# Patient Record
Sex: MALE | Race: WHITE | NOT HISPANIC OR LATINO | URBAN - METROPOLITAN AREA
[De-identification: names, ages, dates, MRNs, and addresses within clinical notes are randomized per-mention and may not be internally consistent; named-entity substitution may affect disease eponyms.]

---

## 2018-06-14 ENCOUNTER — INPATIENT (INPATIENT)
Facility: HOSPITAL | Age: 68
LOS: 0 days | Discharge: HOME | DRG: 310 | End: 2018-06-15
Attending: INTERNAL MEDICINE | Admitting: FAMILY MEDICINE
Payer: COMMERCIAL

## 2018-06-14 VITALS — WEIGHT: 210.1 LBS | HEIGHT: 75 IN

## 2018-06-14 PROCEDURE — 99285 EMERGENCY DEPT VISIT HI MDM: CPT

## 2018-06-14 RX ORDER — SODIUM CHLORIDE 9 MG/ML
3 INJECTION INTRAMUSCULAR; INTRAVENOUS; SUBCUTANEOUS ONCE
Qty: 0 | Refills: 0 | Status: COMPLETED | OUTPATIENT
Start: 2018-06-14 | End: 2018-06-14

## 2018-06-14 RX ORDER — ENOXAPARIN SODIUM 100 MG/ML
100 INJECTION SUBCUTANEOUS ONCE
Qty: 0 | Refills: 0 | Status: COMPLETED | OUTPATIENT
Start: 2018-06-14 | End: 2018-06-14

## 2018-06-14 RX ADMIN — SODIUM CHLORIDE 3 MILLILITER(S): 9 INJECTION INTRAMUSCULAR; INTRAVENOUS; SUBCUTANEOUS at 23:04

## 2018-06-14 NOTE — ED PROVIDER NOTE - OBJECTIVE STATEMENT
69 y/o M with PSHx of open heart surgery presents to ED c/o epigastric pain onset today. Patient states he has cardiac hx, and before he had bypass, experienced similar symptoms to today. States he feels like he has indigestion and was diaphoretic PTA, with slight neck pain. Denies fevers, chills, chest pain, difficulty breathing, nausea, vomiting, headaches, edema, or back pain. After open heart surgery, was found to be in a-fib but spontaneously converted without need for medications. No further acute complaints at this time.

## 2018-06-14 NOTE — ED PROVIDER NOTE - CARE PLAN
Principal Discharge DX:	New onset atrial fibrillation  Secondary Diagnosis:	CAD (coronary artery disease)

## 2018-06-14 NOTE — ED PROVIDER NOTE - SKIN, MLM
Skin normal color for race, warm, dry and intact. No evidence of rash. Skin normal color for race, warm, dry and intact. No evidence of rash. Well healed midline sternotomy scar.

## 2018-06-15 VITALS
SYSTOLIC BLOOD PRESSURE: 116 MMHG | DIASTOLIC BLOOD PRESSURE: 68 MMHG | OXYGEN SATURATION: 94 % | RESPIRATION RATE: 17 BRPM | HEART RATE: 144 BPM

## 2018-06-15 DIAGNOSIS — Z98.890 OTHER SPECIFIED POSTPROCEDURAL STATES: Chronic | ICD-10-CM

## 2018-06-15 DIAGNOSIS — Z29.9 ENCOUNTER FOR PROPHYLACTIC MEASURES, UNSPECIFIED: ICD-10-CM

## 2018-06-15 DIAGNOSIS — E78.5 HYPERLIPIDEMIA, UNSPECIFIED: ICD-10-CM

## 2018-06-15 DIAGNOSIS — I48.91 UNSPECIFIED ATRIAL FIBRILLATION: ICD-10-CM

## 2018-06-15 DIAGNOSIS — I10 ESSENTIAL (PRIMARY) HYPERTENSION: ICD-10-CM

## 2018-06-15 DIAGNOSIS — I25.10 ATHEROSCLEROTIC HEART DISEASE OF NATIVE CORONARY ARTERY WITHOUT ANGINA PECTORIS: ICD-10-CM

## 2018-06-15 LAB
ALBUMIN SERPL ELPH-MCNC: 4.4 G/DL — SIGNIFICANT CHANGE UP (ref 3.3–5.2)
ALP SERPL-CCNC: 71 U/L — SIGNIFICANT CHANGE UP (ref 40–120)
ALT FLD-CCNC: 21 U/L — SIGNIFICANT CHANGE UP
ANION GAP SERPL CALC-SCNC: 14 MMOL/L — SIGNIFICANT CHANGE UP (ref 5–17)
APTT BLD: 29.8 SEC — SIGNIFICANT CHANGE UP (ref 27.5–37.4)
AST SERPL-CCNC: 19 U/L — SIGNIFICANT CHANGE UP
BILIRUB SERPL-MCNC: 1.7 MG/DL — SIGNIFICANT CHANGE UP (ref 0.4–2)
BUN SERPL-MCNC: 25 MG/DL — HIGH (ref 8–20)
CALCIUM SERPL-MCNC: 9 MG/DL — SIGNIFICANT CHANGE UP (ref 8.6–10.2)
CHLORIDE SERPL-SCNC: 99 MMOL/L — SIGNIFICANT CHANGE UP (ref 98–107)
CK SERPL-CCNC: 87 U/L — SIGNIFICANT CHANGE UP (ref 30–200)
CO2 SERPL-SCNC: 24 MMOL/L — SIGNIFICANT CHANGE UP (ref 22–29)
CREAT SERPL-MCNC: 0.73 MG/DL — SIGNIFICANT CHANGE UP (ref 0.5–1.3)
D DIMER BLD IA.RAPID-MCNC: 660 NG/ML DDU — HIGH
D DIMER BLD IA.RAPID-MCNC: 731 NG/ML DDU — HIGH
GLUCOSE SERPL-MCNC: 113 MG/DL — SIGNIFICANT CHANGE UP (ref 70–115)
HCT VFR BLD CALC: 46 % — SIGNIFICANT CHANGE UP (ref 42–52)
HGB BLD-MCNC: 15.5 G/DL — SIGNIFICANT CHANGE UP (ref 14–18)
INR BLD: 1.03 RATIO — SIGNIFICANT CHANGE UP (ref 0.88–1.16)
LYMPHOCYTES # BLD AUTO: 6 % — LOW (ref 20–55)
MCHC RBC-ENTMCNC: 29 PG — SIGNIFICANT CHANGE UP (ref 27–31)
MCHC RBC-ENTMCNC: 33.7 G/DL — SIGNIFICANT CHANGE UP (ref 32–36)
MCV RBC AUTO: 86 FL — SIGNIFICANT CHANGE UP (ref 80–94)
MONOCYTES NFR BLD AUTO: 7 % — SIGNIFICANT CHANGE UP (ref 3–10)
NEUTROPHILS NFR BLD AUTO: 87 % — HIGH (ref 37–73)
NT-PROBNP SERPL-SCNC: 699 PG/ML — HIGH (ref 0–300)
PLAT MORPH BLD: NORMAL — SIGNIFICANT CHANGE UP
PLATELET # BLD AUTO: 179 K/UL — SIGNIFICANT CHANGE UP (ref 150–400)
POTASSIUM SERPL-MCNC: 3.8 MMOL/L — SIGNIFICANT CHANGE UP (ref 3.5–5.3)
POTASSIUM SERPL-SCNC: 3.8 MMOL/L — SIGNIFICANT CHANGE UP (ref 3.5–5.3)
PROT SERPL-MCNC: 7.3 G/DL — SIGNIFICANT CHANGE UP (ref 6.6–8.7)
PROTHROM AB SERPL-ACNC: 11.3 SEC — SIGNIFICANT CHANGE UP (ref 9.8–12.7)
RBC # BLD: 5.35 M/UL — SIGNIFICANT CHANGE UP (ref 4.6–6.2)
RBC # FLD: 12.6 % — SIGNIFICANT CHANGE UP (ref 11–15.6)
RBC BLD AUTO: NORMAL — SIGNIFICANT CHANGE UP
SODIUM SERPL-SCNC: 137 MMOL/L — SIGNIFICANT CHANGE UP (ref 135–145)
TROPONIN T SERPL-MCNC: <0.01 NG/ML — SIGNIFICANT CHANGE UP (ref 0–0.06)
TROPONIN T SERPL-MCNC: <0.01 NG/ML — SIGNIFICANT CHANGE UP (ref 0–0.06)
TSH SERPL-MCNC: 2.09 UIU/ML — SIGNIFICANT CHANGE UP (ref 0.27–4.2)
WBC # BLD: 10.6 K/UL — SIGNIFICANT CHANGE UP (ref 4.8–10.8)
WBC # FLD AUTO: 10.6 K/UL — SIGNIFICANT CHANGE UP (ref 4.8–10.8)

## 2018-06-15 PROCEDURE — 71275 CT ANGIOGRAPHY CHEST: CPT | Mod: 26

## 2018-06-15 PROCEDURE — 93320 DOPPLER ECHO COMPLETE: CPT

## 2018-06-15 PROCEDURE — 84484 ASSAY OF TROPONIN QUANT: CPT

## 2018-06-15 PROCEDURE — 71045 X-RAY EXAM CHEST 1 VIEW: CPT | Mod: 26

## 2018-06-15 PROCEDURE — 93325 DOPPLER ECHO COLOR FLOW MAPG: CPT

## 2018-06-15 PROCEDURE — 71045 X-RAY EXAM CHEST 1 VIEW: CPT

## 2018-06-15 PROCEDURE — 93320 DOPPLER ECHO COMPLETE: CPT | Mod: 26

## 2018-06-15 PROCEDURE — 80053 COMPREHEN METABOLIC PANEL: CPT

## 2018-06-15 PROCEDURE — 99223 1ST HOSP IP/OBS HIGH 75: CPT | Mod: GC

## 2018-06-15 PROCEDURE — 85610 PROTHROMBIN TIME: CPT

## 2018-06-15 PROCEDURE — 85379 FIBRIN DEGRADATION QUANT: CPT

## 2018-06-15 PROCEDURE — 99223 1ST HOSP IP/OBS HIGH 75: CPT | Mod: 25

## 2018-06-15 PROCEDURE — 93312 ECHO TRANSESOPHAGEAL: CPT | Mod: 26

## 2018-06-15 PROCEDURE — 96374 THER/PROPH/DIAG INJ IV PUSH: CPT

## 2018-06-15 PROCEDURE — 93312 ECHO TRANSESOPHAGEAL: CPT

## 2018-06-15 PROCEDURE — 93325 DOPPLER ECHO COLOR FLOW MAPG: CPT | Mod: 26

## 2018-06-15 PROCEDURE — 92960 CARDIOVERSION ELECTRIC EXT: CPT

## 2018-06-15 PROCEDURE — 12345: CPT | Mod: NC

## 2018-06-15 PROCEDURE — 93005 ELECTROCARDIOGRAM TRACING: CPT

## 2018-06-15 PROCEDURE — 93010 ELECTROCARDIOGRAM REPORT: CPT

## 2018-06-15 PROCEDURE — 85730 THROMBOPLASTIN TIME PARTIAL: CPT

## 2018-06-15 PROCEDURE — 71275 CT ANGIOGRAPHY CHEST: CPT

## 2018-06-15 PROCEDURE — 96372 THER/PROPH/DIAG INJ SC/IM: CPT | Mod: XU

## 2018-06-15 PROCEDURE — 99285 EMERGENCY DEPT VISIT HI MDM: CPT | Mod: 25

## 2018-06-15 PROCEDURE — 82550 ASSAY OF CK (CPK): CPT

## 2018-06-15 PROCEDURE — 84443 ASSAY THYROID STIM HORMONE: CPT

## 2018-06-15 PROCEDURE — 36415 COLL VENOUS BLD VENIPUNCTURE: CPT

## 2018-06-15 PROCEDURE — 85027 COMPLETE CBC AUTOMATED: CPT

## 2018-06-15 PROCEDURE — 83880 ASSAY OF NATRIURETIC PEPTIDE: CPT

## 2018-06-15 RX ORDER — METOPROLOL TARTRATE 50 MG
50 TABLET ORAL
Qty: 0 | Refills: 0 | Status: DISCONTINUED | OUTPATIENT
Start: 2018-06-15 | End: 2018-06-15

## 2018-06-15 RX ORDER — ATORVASTATIN CALCIUM 80 MG/1
80 TABLET, FILM COATED ORAL AT BEDTIME
Qty: 0 | Refills: 0 | Status: DISCONTINUED | OUTPATIENT
Start: 2018-06-15 | End: 2018-06-15

## 2018-06-15 RX ORDER — ASPIRIN/CALCIUM CARB/MAGNESIUM 324 MG
1 TABLET ORAL
Qty: 90 | Refills: 0 | OUTPATIENT
Start: 2018-06-15 | End: 2018-09-12

## 2018-06-15 RX ORDER — ENOXAPARIN SODIUM 100 MG/ML
95 INJECTION SUBCUTANEOUS ONCE
Qty: 0 | Refills: 0 | Status: DISCONTINUED | OUTPATIENT
Start: 2018-06-15 | End: 2018-06-15

## 2018-06-15 RX ORDER — AMLODIPINE BESYLATE 2.5 MG/1
5 TABLET ORAL DAILY
Qty: 0 | Refills: 0 | Status: DISCONTINUED | OUTPATIENT
Start: 2018-06-15 | End: 2018-06-15

## 2018-06-15 RX ORDER — ENOXAPARIN SODIUM 100 MG/ML
100 INJECTION SUBCUTANEOUS
Qty: 0 | Refills: 0 | Status: DISCONTINUED | OUTPATIENT
Start: 2018-06-15 | End: 2018-06-15

## 2018-06-15 RX ORDER — SODIUM CHLORIDE 9 MG/ML
1000 INJECTION, SOLUTION INTRAVENOUS
Qty: 0 | Refills: 0 | Status: DISCONTINUED | OUTPATIENT
Start: 2018-06-15 | End: 2018-06-15

## 2018-06-15 RX ORDER — DICLOFENAC SODIUM 75 MG/1
75 TABLET, DELAYED RELEASE ORAL
Qty: 0 | Refills: 0 | COMMUNITY

## 2018-06-15 RX ORDER — ATORVASTATIN CALCIUM 80 MG/1
1 TABLET, FILM COATED ORAL
Qty: 0 | Refills: 0 | COMMUNITY

## 2018-06-15 RX ORDER — ENOXAPARIN SODIUM 100 MG/ML
100 INJECTION SUBCUTANEOUS EVERY 12 HOURS
Qty: 0 | Refills: 0 | Status: DISCONTINUED | OUTPATIENT
Start: 2018-06-15 | End: 2018-06-15

## 2018-06-15 RX ORDER — METOPROLOL TARTRATE 50 MG
1 TABLET ORAL
Qty: 180 | Refills: 0 | OUTPATIENT
Start: 2018-06-15 | End: 2018-09-12

## 2018-06-15 RX ORDER — APIXABAN 2.5 MG/1
5 TABLET, FILM COATED ORAL
Qty: 0 | Refills: 0 | Status: DISCONTINUED | OUTPATIENT
Start: 2018-06-15 | End: 2018-06-15

## 2018-06-15 RX ORDER — PANTOPRAZOLE SODIUM 20 MG/1
40 TABLET, DELAYED RELEASE ORAL
Qty: 0 | Refills: 0 | Status: DISCONTINUED | OUTPATIENT
Start: 2018-06-15 | End: 2018-06-15

## 2018-06-15 RX ORDER — ASPIRIN/CALCIUM CARB/MAGNESIUM 324 MG
81 TABLET ORAL DAILY
Qty: 0 | Refills: 0 | Status: DISCONTINUED | OUTPATIENT
Start: 2018-06-15 | End: 2018-06-15

## 2018-06-15 RX ORDER — LISINOPRIL 2.5 MG/1
20 TABLET ORAL DAILY
Qty: 0 | Refills: 0 | Status: DISCONTINUED | OUTPATIENT
Start: 2018-06-15 | End: 2018-06-15

## 2018-06-15 RX ORDER — AMLODIPINE BESYLATE 2.5 MG/1
5 TABLET ORAL ONCE
Qty: 0 | Refills: 0 | Status: DISCONTINUED | OUTPATIENT
Start: 2018-06-15 | End: 2018-06-15

## 2018-06-15 RX ORDER — ASPIRIN/CALCIUM CARB/MAGNESIUM 324 MG
325 TABLET ORAL
Qty: 0 | Refills: 0 | COMMUNITY

## 2018-06-15 RX ORDER — APIXABAN 2.5 MG/1
1 TABLET, FILM COATED ORAL
Qty: 180 | Refills: 0 | OUTPATIENT
Start: 2018-06-15 | End: 2018-09-12

## 2018-06-15 RX ORDER — AMLODIPINE BESYLATE 2.5 MG/1
1 TABLET ORAL
Qty: 0 | Refills: 0 | COMMUNITY

## 2018-06-15 RX ORDER — RIVAROXABAN 15 MG-20MG
20 KIT ORAL EVERY 24 HOURS
Qty: 0 | Refills: 0 | Status: DISCONTINUED | OUTPATIENT
Start: 2018-06-15 | End: 2018-06-15

## 2018-06-15 RX ORDER — ASPIRIN/CALCIUM CARB/MAGNESIUM 324 MG
1 TABLET ORAL
Qty: 0 | Refills: 0 | COMMUNITY

## 2018-06-15 RX ORDER — PANTOPRAZOLE SODIUM 20 MG/1
1 TABLET, DELAYED RELEASE ORAL
Qty: 90 | Refills: 0 | OUTPATIENT
Start: 2018-06-15 | End: 2018-09-12

## 2018-06-15 RX ADMIN — SODIUM CHLORIDE 120 MILLILITER(S): 9 INJECTION, SOLUTION INTRAVENOUS at 06:32

## 2018-06-15 RX ADMIN — Medication 81 MILLIGRAM(S): at 11:04

## 2018-06-15 RX ADMIN — ENOXAPARIN SODIUM 100 MILLIGRAM(S): 100 INJECTION SUBCUTANEOUS at 00:07

## 2018-06-15 RX ADMIN — SODIUM CHLORIDE 120 MILLILITER(S): 9 INJECTION, SOLUTION INTRAVENOUS at 13:27

## 2018-06-15 RX ADMIN — ENOXAPARIN SODIUM 100 MILLIGRAM(S): 100 INJECTION SUBCUTANEOUS at 13:27

## 2018-06-15 RX ADMIN — PANTOPRAZOLE SODIUM 40 MILLIGRAM(S): 20 TABLET, DELAYED RELEASE ORAL at 07:58

## 2018-06-15 NOTE — CONSULT NOTE ADULT - SUBJECTIVE AND OBJECTIVE BOX
History obtained by: Patient and medical record     obtained: No    Chief complaint:  "I have indigestion"    HPI:  This is 67 y/o male with hx of CAD s/p CABG x3 (May 2014), HTN, HLD who presents with indigestion, epigastric dyscomfort and diaphoresis since last morning. Pt denies chest pain, palpitations, SOB, N/V. In ED pt was found to have A-fib with RVR to 160's. He received Cardizem 20 mg IVP which brought his HR down to 120's but he still remains in A-fib. EKG shows A-fib with RVR and ST depressions in anterior leads. First troponin negative. Pt has a hx of brief, transient A-fib after his CABG. Pt currently lives in [place] and came to [place] to attend a wedding. His health care provider group is [facility] in [place].      REVIEW OF SYMPTOMS:   Cardiovascular: as per HPI  Respiratory:  denies dyspnea,   cough,     Genitourinary:  No dysuria, no hematuria;   Gastrointestinal:   No dark color stool, no melena, no diarrhea, no constipation, c/o indigestion and epigastric dyscomfort  Neurological: No headache, no dizziness, no slurred speech;    Psychiatric: No agitation, no anxiety.  ALL OTHER REVIEW OF SYSTEMS ARE NEGATIVE.    MEDICATIONS  (home):  Diclofenac 75 mg  Amlodipine 5 mg  Atorvastatin 80 mg   mg      PAST MEDICAL & SURGICAL HISTORY:  CABG x3 2014  multiple knee surgeries      FAMILY HISTORY:      SOCIAL HISTORY: lives with wife    CIGARETTES:  never smoked    ALCOHOL: on occasion    DRUGS: denies      Vital Signs Last 24 Hrs  T(C): 37.1 (14 Jun 2018 22:59), Max: 37.1 (14 Jun 2018 22:59)  T(F): 98.7 (14 Jun 2018 22:59), Max: 98.7 (14 Jun 2018 22:59)  HR: 112 (15 Elliott 2018 00:19) (112 - 160)  BP: 113/66 (15 Elliott 2018 00:19) (113/60 - 127/79)  BP(mean): --  RR: 18 (15 Elliott 2018 00:19) (18 - 20)  SpO2: 95% (15 Elliott 2018 00:19) (94% - 95%)    PHYSICAL EXAM:  General: WN/WD NAD  Neurology: A&Ox3, nonfocal, SANTIAGO x 4  Eyes: PERRL/ EOMI, Gross vision intact  ENT/Neck: Neck supple, trachea midline, No JVD, Gross hearing intact  Respiratory: CTA B/L, No wheezing, rales, rhonchi  CV: irregular, tachy, S1S2, no murmurs  Abdominal: Soft, NT, ND +BS,   Extremities: No edema, + peripheral pulses  Skin: No Rashes, Hematoma, Ecchymosis          INTERPRETATION OF TELEMETRY: A-fib with 's-120's    ECG: A-fib with RVR, ventricular rate 137 bpm, ST depressions in V2-V4      I&O's Detail      LABS:                        15.5   10.6  )-----------( 179      ( 15 Elliott 2018 00:21 )             46.0     06-15    137  |  99  |  25.0<H>  ----------------------------<  113  3.8   |  24.0  |  0.73    Ca    9.0      15 Elliott 2018 00:21    TPro  7.3  /  Alb  4.4  /  TBili  1.7  /  DBili  x   /  AST  19  /  ALT  21  /  AlkPhos  71  06-15    CARDIAC MARKERS ( 15 Elliott 2018 00:21 )  x     / <0.01 ng/mL / 87 U/L / x     / x          PT/INR - ( 15 Elliott 2018 00:21 )   PT: 11.3 sec;   INR: 1.03 ratio         PTT - ( 15 Elliott 2018 00:21 )  PTT:29.8 sec    I&O's Summary      RADIOLOGY & ADDITIONAL STUDIES:  X-ray:    PREVIOUS DIAGNOSTIC TESTING:      ECHO: n/a    STRESS: n/a    CATHETERIZATION: n/a

## 2018-06-15 NOTE — H&P ADULT - NSHPREVIEWOFSYSTEMS_GEN_ALL_CORE
CONSTITUTIONAL: No weakness, fevers or chills  EYES/ENT: No visual changes;  No vertigo or throat pain   NECK: No pain or stiffness  RESPIRATORY: No cough, wheezing, hemoptysis; No shortness of breath  CARDIOVASCULAR: No chest pain or palpitations  GASTROINTESTINAL: No abdominal or epigastric pain. No nausea, vomiting, or hematemesis; No diarrhea or constipation. No melena or hematochezia.  GENITOURINARY: No dysuria, frequency or hematuria  NEUROLOGICAL: No numbness or weakness  SKIN: No itching, burning, rashes, or lesions   All other review of systems is negative unless indicated above CONSTITUTIONAL: +weakness, fevers or chills  EYES/ENT: No visual changes;  No vertigo or throat pain   NECK: No pain or stiffness  RESPIRATORY: No cough, wheezing, hemoptysis; No shortness of breath  CARDIOVASCULAR: No chest pain or palpitations  GASTROINTESTINAL: No abdominal or epigastric pain. No nausea, vomiting, or hematemesis; No diarrhea or constipation. No melena or hematochezia.  GENITOURINARY: No dysuria, frequency or hematuria  NEUROLOGICAL: No numbness or weakness  SKIN: No itching, burning, rashes, or lesions   All other review of systems is negative unless indicated above

## 2018-06-15 NOTE — ED ADULT NURSE REASSESSMENT NOTE - NS ED NURSE REASSESS COMMENT FT1
Cardiac NP at pt bedside at this time, POC discussed awaiting further orders.
Pt resting comfortably on stretcher, offers no complaints, pt experiencing episodes of A-flutter and A-fib on monitor, call placed to admitting team relating findings, able to verbalize understanding of plan of care and agree with admission, awaiting bed assignment at this time, safety maintained.
pt in no apparent distress, resting comfortably, denies any pain or discomfort, pt made aware of possible cardioversion for today, verbalized understanding.
pt AOX4 denies any pain or discomfort at the moment, IV patent flushing without difficulty no signs of infiltration. Plan of care explained to pt, pt verbalized understanding, wife at bedside.

## 2018-06-15 NOTE — DISCHARGE NOTE ADULT - CARE PROVIDER_API CALL
Leon Izaguirre), Cardiology; Internal Medicine  [address] [place] [address]  Phone: [phone]  Fax: [phone]    Dr. Augusto James  Cardiologist  [facility]  Phone: (   )    -  Fax: (   )    -

## 2018-06-15 NOTE — CONSULT NOTE ADULT - ATTENDING COMMENTS
Pt with hx of CABG, as above, but no known history of CHF. He is very active at baseline. Presents with adominal discomfort and malaise, found to have AF with RVR. Tele reveals episodes of organized atrial flutter as well with 2:1 conduction. Now s/p diltiazem, with improved rates (but still mostly >100 bpm) and no symptoms at rest.   Pt reports normal stress test within the last year. Excellent exercise capacity at baseline with no usual exertional limitations. He reports having had AF twice following his cardiac surgery, and was on anticoagulation for several months but it was ultimately stopped.   labs notable for nml troponin (first set), and elevated d-dimer.     -given recent travel, elevated d-dimer, and atrial arrhythmia, would do CT to r/o PE  -likely LAM/dCCV today if remains in AF  -serial enzymes  -Will need continuous anticoagulation. Start NOAC if feasible eg Xarelto. Can reduce ASA to 81mg qd. will need to remain on continuous anticoagulation (likely indefinately, CHADSVASc=3)  -beta blocker. start metoprolol 25mg q6 (transition to long acting on discharge)

## 2018-06-15 NOTE — DISCHARGE NOTE ADULT - MEDICATION SUMMARY - MEDICATIONS TO TAKE
I will START or STAY ON the medications listed below when I get home from the hospital:    aspirin 81 mg oral tablet, chewable  -- 1 tab(s) by mouth once a day MDD:81  -- Indication: For Afib    apixaban 5 mg oral tablet  -- 1 tab(s) by mouth every 12 hours MDD:10  -- Indication: For Afib    atorvastatin 80 mg oral tablet  -- 1 tab(s) by mouth once a day  -- Indication: For HLD    metoprolol tartrate 50 mg oral tablet  -- 1 tab(s) by mouth every 12 hours   -- Indication: For Afib    amLODIPine 5 mg oral tablet  -- 1 tab(s) by mouth once a day  -- Indication: For CCB    pantoprazole 40 mg oral delayed release tablet  -- 1 tab(s) by mouth once a day (before a meal)  -- Indication: For gerd

## 2018-06-15 NOTE — PROGRESS NOTE ADULT - SUBJECTIVE AND OBJECTIVE BOX
Internal Medicine Hospitalist - Dr. Judson ARMSTRONG    [id number]    68y      Male    Patient is a 68y old  Male who presents with a chief complaint of Epigastric Discomfort (15 Elliott 2018 02:40)    INTERVAL HPI/ OVERNIGHT EVENTS: Patient is seen and examined, denied chest pain, SOB, palpitation, schedule for LAM and cardioversion today    REVIEW OF SYSTEMS:    Denied fever, chills, abd. pain, nausea, vomiting, chest pain, SOB, headache, dizziness    PHYSICAL EXAM:    Vital Signs Last 24 Hrs  T(C): 36.9 (15 Elliott 2018 14:00), Max: 37.9 (15 Elliott 2018 05:37)  T(F): 98.4 (15 Elliott 2018 14:00), Max: 100.2 (15 Elliott 2018 05:37)  HR: 144 (15 Elliott 2018 14:19) (80 - 160)  BP: 116/68 (15 Elliott 2018 14:19) (99/57 - 127/79)  BP(mean): --  RR: 17 (15 Elliott 2018 14:19) (17 - 20)  SpO2: 94% (15 Elliott 2018 14:19) (94% - 98%)    GENERAL: NAD  CHEST/LUNG: CTA b/l   HEART: S1S2+ audible  ABDOMEN: Soft, Nontender, Nondistended; Bowel sounds present  EXTREMITIES:  no edema  CNS: AAO X 3  Psychiatry: normal mood    LABS:                        15.5   10.6  )-----------( 179      ( 15 Elliott 2018 00:21 )             46.0     06-15    137  |  99  |  25.0<H>  ----------------------------<  113  3.8   |  24.0  |  0.73    Ca    9.0      15 Elliott 2018 00:21    TPro  7.3  /  Alb  4.4  /  TBili  1.7  /  DBili  x   /  AST  19  /  ALT  21  /  AlkPhos  71  06-15    PT/INR - ( 15 Elliott 2018 00:21 )   PT: 11.3 sec;   INR: 1.03 ratio         PTT - ( 15 Elliott 2018 00:21 )  PTT:29.8 sec        MEDICATIONS  (STANDING):  aspirin  chewable 81 milliGRAM(s) Oral daily  atorvastatin 80 milliGRAM(s) Oral at bedtime  enoxaparin Injectable 100 milliGRAM(s) SubCutaneous <User Schedule>  lisinopril 20 milliGRAM(s) Oral daily  metoprolol tartrate 50 milliGRAM(s) Oral two times a day  multiple electrolytes Injection Type 1 1000 milliLiter(s) (120 mL/Hr) IV Continuous <Continuous>  pantoprazole    Tablet 40 milliGRAM(s) Oral before breakfast    MEDICATIONS  (PRN):      RADIOLOGY & ADDITIONAL TEST    < from: CT Angio Chest w/ IV Cont (06.15.18 @ 08:52) >    IMPRESSION:     No pulmonary embolism.    No acute pleural-parenchymal abnormality.    < end of copied text >
TRANSESOPHAGEAL ECHOCARDIOGRAM     After risks and benefits of procedures were explained, informed consent was obtained and placed in chart.   The patient received topical anesthestic to the oropharynx with viscous lidocaine.  Refer to Anesthesia note for sedation details.  The LAM probe was passed into the esophagus without difficulty.  Transesophageal and transgastric images were obtained.  The LAM probe was removed without difficulty and examined.  There was no evidence for bleeding.  The patient tolerated the procedure well without any immediate LAM-related complications.      Preliminary Findings:  No cardiac mass, vegetations, thrombus or shunts visualized.   No spontaneous echo contrast or thrombus in the LA/PK/RA/RAA.    PK systolic empyting velocities were normal.   Overall LV systolic function was normal. Estimated LVEF = 60-65%  RV systolic function was normal  No significant valvular abnormality.    No pericardial effusion.   There was mild, non-mobile atheroma seen in the thoracic aorta.     Patient succesfully converted to sinus rhythm with synchronized  200 J of direct current cardioversion by Dr. Izaguirre    Final report to follow.

## 2018-06-15 NOTE — H&P ADULT - ATTENDING COMMENTS
Patient seen and evaluated in ER. Resting comfortably. Continue Cardizem 30 mg q 6 hours and titrate as needed. Continue home medications except Norvasc. Follow D- Dimer, if elevated then CTA Chest to rule out PE as patient drove from [place]. ECHO in am.

## 2018-06-15 NOTE — DISCHARGE NOTE ADULT - PATIENT PORTAL LINK FT
You can access the [facility] Patient Portal, offered by [facility], by registering with the following website: [email or web address]

## 2018-06-15 NOTE — H&P ADULT - NSHPSOCIALHISTORY_GEN_ALL_CORE
Patient [place] in [place]. Patient is here for a wedding.  Patient denies having a hx of alcohol use nor illicit drugs. Patient lives in [place]. Patient is here for a wedding.  Patient denies having a hx of alcohol use nor illicit drugs.

## 2018-06-15 NOTE — PROGRESS NOTE ADULT - ASSESSMENT
This is 67 yo man with PMH of  of paroxysmal Afib(not- anticoagulation , htn, Hld, presented the ED- with complaints of abdominal pain. Patient was found to be in Atrial fibrillation, seen by cardiology, schedule for TTE and cardioversion today.    A/P    >A fib - rate controlled  appreciate cardiology,   schedule for TTE and cardioversion today.  c/w full dose Lovenox - long term AC as per cardiology   c/w Metoprolol 50 bid   serial troponin negative  TSH normal   CTA - negative for PE  TTE     >CAD - c/w aspirin, BB, statin     >HTN - normotensive  c/w Metoprolol    >DVT PPx - on Lovenox This is 69 yo man with PMH of  of paroxysmal Afib(not- anticoagulation , htn, Hld, presented the ED- with complaints of abdominal pain. Patient was found to be in Atrial fibrillation, seen by cardiology, schedule for TTE and cardioversion today.    A/P    >A fib - rate controlled  appreciate cardiology,   schedule for TTE and cardioversion today.  c/w full dose Lovenox - long term AC as per cardiology   c/w Metoprolol 50 bid   serial troponin negative  TSH normal   CTA - negative for PE  TTE     >CAD - c/w aspirin, BB, statin     >HTN - normotensive  c/w Metoprolol    >HLD - c/w statin  lipid profile in am     >DVT PPx - on Lovenox

## 2018-06-15 NOTE — H&P ADULT - NSHPLABSRESULTS_GEN_ALL_CORE
15.5   10.6  )-----------( 179      ( 15 Elliott 2018 00:21 )             46.0     15 Elliott 2018 00:21    137    |  99     |  25.0   ----------------------------<  113    3.8     |  24.0   |  0.73     Ca    9.0        15 Elliott 2018 00:21    TPro  7.3    /  Alb  4.4    /  TBili  1.7    /  DBili  x      /  AST  19     /  ALT  21     /  AlkPhos  71     15 Elliott 2018 00:21    LIVER FUNCTIONS - ( 15 Elliott 2018 00:21 )  Alb: 4.4 g/dL / Pro: 7.3 g/dL / ALK PHOS: 71 U/L / ALT: 21 U/L / AST: 19 U/L / GGT: x           PT/INR - ( 15 Elliott 2018 00:21 )   PT: 11.3 sec;   INR: 1.03 ratio         PTT - ( 15 Elliott 2018 00:21 )  PTT:29.8 sec  CAPILLARY BLOOD GLUCOSE        CARDIAC MARKERS ( 15 Elliott 2018 00:21 )  x     / <0.01 ng/mL / 87 U/L / x     / x

## 2018-06-15 NOTE — DISCHARGE NOTE ADULT - OTHER SIGNIFICANT FINDINGS
Pt was s/p LAM and cardioversion, clear by cardiology to discharge on [facility]  f/u cardiology as an outpatient

## 2018-06-15 NOTE — H&P ADULT - ASSESSMENT
A 69 yo man with pmhx of paroxysmal Afib(not- anticoagulation , htn, Hld, presented the ED- with complaints of abdominal pain. Patient was found to be in Atrial fibrilation.

## 2018-06-15 NOTE — CONSULT NOTE ADULT - PROBLEM SELECTOR RECOMMENDATION 9
-admit to telemetry  -Cardizem 30 mg Q6, Cardizem 10 mg IVP for HR>130  -anticoagulation with full dose Lovenox  -may need cardioversion if doesn't convert with medical therapy  -cardiac markers x3 sets  -thyroid studies  -echocardiogram

## 2018-06-15 NOTE — H&P ADULT - HISTORY OF PRESENT ILLNESS
This is 67 yo man with hx of CAD s/p CABG x3 (May 2014), HTN, HLD who presents with indigestion, epigastric discomfort and diaphoresis since last morning. The patient recently traveled from [place] via car, drove total of 18 hours. As per patient states he did take multiple breaks along the way, to stretch and use the restroom. The patient states for over the past month he has had "brief" episodes where he complaints of having chest discomfort, that resolves on its own.  The patient does admit to having one episode of A-fib, that presented after his open heart surgery. The patient was treated with anticoagulation as well medical management The patient never underwent, cardioversion, he was treated medically and responded well. Anticoagulation was d/c and he was kept on appropriate medical therapy.   Pt does admit to having, Epigastric discomfort, diaphoresis and generalized weakness  Pt denies having Light-headedness, blurry vision, CP, SOB, orthopnea paroxsymal nocturnal dyspnea, diarrhea, nor swelling of the lower extremity. This is 67 yo man with hx of CAD s/p CABG x3 (May 2014), HTN, HLD who presents with indigestion, epigastric discomfort and diaphoresis since last morning. The patient recently traveled from [place] via car, drove total of 18 hours. As per patient states he did take multiple breaks along the way, to stretch and use the restroom. The patient states for over the past month he has had "brief" episodes where he complaints of having chest discomfort, that resolves on its own.  The patient does admit to having one episode of A-fib, that presented after his open heart surgery. The patient was treated with anticoagulation as well medical management The patient never underwent, cardioversion, he was treated medically and responded well. Anticoagulation was d/c and he was kept on appropriate medical therapy.   Pt does admit to having, Epigastric discomfort, diaphoresis and generalized weakness  Pt denies having Light-headedness, blurry vision, CP, SOB, orthopnea paroxsymal nocturnal dyspnea, diarrhea, nor swelling of the lower extremity.

## 2018-06-15 NOTE — H&P ADULT - NSHPPHYSICALEXAM_GEN_ALL_CORE
Vital Signs Last 24 Hrs  T(C): 37.1 (14 Jun 2018 22:59), Max: 37.1 (14 Jun 2018 22:59)  T(F): 98.7 (14 Jun 2018 22:59), Max: 98.7 (14 Jun 2018 22:59)  HR: 112 (15 Elliott 2018 00:19) (112 - 160)  BP: 113/66 (15 Elliott 2018 00:19) (113/60 - 127/79)  RR: 18 (15 Elliott 2018 00:19) (18 - 20)  SpO2: 95% (15 Elliott 2018 00:19) (94% - 95%)

## 2018-06-15 NOTE — H&P ADULT - RS GEN PE MLT RESP DETAILS PC
breath sounds equal/no rhonchi/respirations non-labored/no chest wall tenderness/no intercostal retractions/normal/no rales/no wheezes/good air movement/airway patent

## 2018-06-15 NOTE — CONSULT NOTE ADULT - ASSESSMENT
This is 67 y/o male with hx of CAD s/p CABG x3 (May 2014), HTN, HLD who presents with indigestion, epigastric dyscomfort and diaphoresis since last morning. Pt denies chest pain, palpitations, SOB, N/V. In ED pt was found to have A-fib with RVR to 160's. He received Cardizem 20 mg IVP which brought his HR down to 120's but he still remains in A-fib. EKG shows A-fib with RVR and ST depressions in anterior leads. First troponin negative. Pt has a hx of brief, transient A-fib after his CABG. Pt currently lives in [place] and came to [place] to attend a wedding. His health care provider group is [facility] in [place].

## 2018-06-15 NOTE — H&P ADULT - PROBLEM SELECTOR PLAN 1
Admit patient to medicine, telemetry unit. Vitals q4. Diet DASH.  - Patient had one prior episode of Afib- was medically managed and it resolved and no longer on anticoagulation  - Hemodynamically stable    - CBC performed no evidence of infectious etiology. Troponin 1st negative. 2nd and 3rd set pending  - EKG- confirms presence of Afib with RVR  - TSH pending  - TTE pending   - Cardizzem 30mg q6 with holding parameters  - Cardiology consult appreciated. Admit patient to medicine, telemetry unit. Vitals q4. Diet NPO  - Patient had one prior episode of Afib- was medically managed and it resolved and no longer on anticoagulation  - Hemodynamically stable    - CBC performed no evidence of infectious etiology. Troponin 1st negative. 2nd and 3rd set pending  - EKG- confirms presence of Afib with RVR  - TSH pending  - TTE pending   - Cardizem 30mg q6 with holding parameters  - Cardiology consult appreciated. Admit patient to medicine, telemetry unit. Vitals q4. Diet NPO  - Patient had one prior episode of Afib- was medically managed and it resolved and no longer on anticoagulation  - Hemodynamically stable    - CBC performed no evidence of infectious etiology. Troponin 1st negative. 2nd and 3rd set pending  - EKG- confirms presence of Afib with RVR  - D-Dimer. If elevated will order CTA Chest.  - TSH pending  - TTE pending   - Cardizem 30mg q6 with holding parameters  - FD Lovenox given in ER  - Cardiology consult appreciated.

## 2018-06-15 NOTE — DISCHARGE NOTE ADULT - MEDICATION SUMMARY - MEDICATIONS TO STOP TAKING
I will STOP taking the medications listed below when I get home from the hospital:    diclofenac  -- 75 milligram(s) by mouth once a day

## 2018-06-15 NOTE — DISCHARGE NOTE ADULT - HOSPITAL COURSE
67 y/o thin white male traveling from [place] presented with AFib w/RVR Atrial flutter.  S/P CAD s/p CABG .  Underwent urgent LAM which revealed no intracardiac thrombus with normal LV/EF  Cardioversion at 200J x1 to NSR with ECG.    Tolerated procedure well w/o complications.  Seen post procedure by Dr. Izaguirre.    +gag +swallow Patent airway OOB ambulating   Denies SOB, CP, NV, HA, dizziness, palpitations, site pain  PHYSICAL EXAM: A&Ox3 NAD Skin warm and dry  NEURO: Speech intact +gag +swallow Tongue midline SANTIAGO  NECK: No JVD, trachea midline. Eupneic  Neuro: SANTIAGO/FROM Speech intact CN II-XII intact  Lungs: CTA latasha Room O2 saturations >95% Eupneic  Heart: RRR SR on tele/ECG   Abd: soft nontender +BS x4 Eating/voiding  Ext: SANTIAGO/FROM All PP +    A- AFib RVR w/successful CV x1 200J after LAM confirmed no LV thrombus    P- Will start patient on Eliquis 5mg q12 beginning at 11P tonight (10 hours post Lovenox) and daily ASA 81mg, metoprolol 50 q12, prior amlodipine/atorvastatin  To nearest ED for breathing/swallowing concerns  [facility] confirmed coverage for DOAC wife co-pay agreeable to pt/BB/PPI/ASA 81  D/W Dr. Roper who agrees with discharge per cardiology approval  CTA neg for PE despite dDimer

## 2018-06-15 NOTE — DISCHARGE NOTE ADULT - PROVIDER TOKENS
TOKEN:'[id number]',FREE:[LAST:[Jacob],PHONE:[(   )    -],FAX:[(   )    -],ADDRESS:[Dr. Augusto James  Cardiologist  [place], [place]]]

## 2018-06-15 NOTE — DISCHARGE NOTE ADULT - CARE PLAN
Principal Discharge DX:	Atrial fibrillation with RVR  Goal:	optimal cardiac function  Assessment and plan of treatment:	 Cardiology in [facility]

## 2018-12-21 NOTE — ED ADULT TRIAGE NOTE - ESI TRIAGE ACUITY LEVEL, MLM
2 RN skin check completed with Navin MARK.  Dressing to right radial cath site clean, dry and intact.  Other wise no skin issues or breakdown noted.   2